# Patient Record
Sex: MALE | Race: WHITE
[De-identification: names, ages, dates, MRNs, and addresses within clinical notes are randomized per-mention and may not be internally consistent; named-entity substitution may affect disease eponyms.]

---

## 2018-01-03 ENCOUNTER — HOSPITAL ENCOUNTER (OUTPATIENT)
Dept: HOSPITAL 17 - PHSDC | Age: 53
Discharge: HOME | End: 2018-01-03
Attending: SURGERY
Payer: MEDICARE

## 2018-01-03 VITALS
RESPIRATION RATE: 16 BRPM | DIASTOLIC BLOOD PRESSURE: 76 MMHG | SYSTOLIC BLOOD PRESSURE: 142 MMHG | TEMPERATURE: 98.1 F | OXYGEN SATURATION: 95 % | HEART RATE: 84 BPM

## 2018-01-03 VITALS — HEART RATE: 91 BPM

## 2018-01-03 VITALS — HEIGHT: 66 IN | WEIGHT: 242.51 LBS | BODY MASS INDEX: 38.97 KG/M2

## 2018-01-03 DIAGNOSIS — M72.0: Primary | ICD-10-CM

## 2018-01-03 LAB
ERYTHROCYTE [DISTWIDTH] IN BLOOD BY AUTOMATED COUNT: 13.5 % (ref 11.6–17.2)
HCT VFR BLD CALC: 36.5 % (ref 39–51)
HGB BLD-MCNC: 11.9 GM/DL (ref 13–17)
MCH RBC QN AUTO: 28.5 PG (ref 27–34)
MCHC RBC AUTO-ENTMCNC: 32.5 % (ref 32–36)
MCV RBC AUTO: 87.6 FL (ref 80–100)
PLATELET # BLD: 217 TH/MM3 (ref 150–450)
PMV BLD AUTO: 7.6 FL (ref 7–11)
RBC # BLD AUTO: 4.17 MIL/MM3 (ref 4.5–5.9)
WBC # BLD AUTO: 7.2 TH/MM3 (ref 4–11)

## 2018-01-03 PROCEDURE — 36415 COLL VENOUS BLD VENIPUNCTURE: CPT

## 2018-01-03 PROCEDURE — 01810 ANES PX NRV MUSC F/ARM WRST: CPT

## 2018-01-03 PROCEDURE — 88304 TISSUE EXAM BY PATHOLOGIST: CPT

## 2018-01-03 PROCEDURE — 26123 RELEASE PALM CONTRACTURE: CPT

## 2018-01-03 PROCEDURE — 85027 COMPLETE CBC AUTOMATED: CPT

## 2018-01-03 PROCEDURE — 26125 RELEASE PALM CONTRACTURE: CPT

## 2018-01-03 NOTE — MP
cc:

MING HARDEN III, M.D.

****

 

 

DATE OF SURGERY

01/03/2018

 

PREOPERATIVE DIAGNOSIS

Left fourth and fifth Dupuytren's contractures.

 

 

PROCEDURE PERFORMED

1. Left fifth palmar fasciectomy to the PIP joint.

2. Left fourth finger palmar fasciectomy.

3. Full-thickness skin graft left small finger 15 cm2 total.

4. Periarterial sympathectomy superficial palmar arch left hand.

 

SURGEON

Ming Santiago III, MD

 

 

PROCEDURE

The patient was brought to the operative, placed supine on the operating table

after the correct site and side of surgery were verified by members of each

team in the room multiple times including the patient and myself and, after

adequate preoperative markings and preoperative written consent were verified

by everyone and, after adequate preop time-out was performed to everyone's

satisfaction, and after adequate general anesthesia had been achieved, the left

upper extremity was prepped and draped in traditional sterile surgical fashion.

A 50/50 mixture of 2% plain lidocaine and 0.5% plain Marcaine was infiltrated

in the skin and the subcutaneous tissue in the area of the planned incision.

The limb was exsanguinated with an Ace wrap.  A highly placed well-padded

axillary tourniquet was inflated to 200 mmHg for a total 35 minutes.

 

A longitudinally oriented incision over the palmar cords were made in the fifth

ray, carried down through skin and subcutaneous tissue.  Blunt dissection was

then used and the cord was found to be very extensive and was dissected free

all the way out to the PIP joint.  The checkrein ligaments were then incised

and the volar plate had to be incised as well to allow for extension of the PIP

joint.  There was still contracture but when the wrist was flexed the finger

was easily extended to neutral.  Relaxing incisions had to be made in the PIP

and the MP flexion creases as well as the distal palmar flexion crease.

 

The fascial cord to the fourth finger was also dissected free using the same

incision and this went just distal to the MP flexion crease.  The ring finger

was easily fully extended.  At this point the axillary tourniquet was released

and the hand and all fingers became immediately soft, pink and warm except for

the distal two-thirds of the small finger.  Neurovascular bundles were

identified early in the operation and protected the entire time and their

condition was then verified and they were not injured.

 

Periarticular sympathectomy in the palm was then performed in the common

digital arteries.  Sympathectomy along the area of the digital arteries that

was in spasm was performed.  There was no papaverine available.  2% plain

lidocaine was then used topically on the digital arteries and this had a

dramatic effect.  The finger slowly became soft, pink, full and warm and had

brisk capillary refill of less than 2 seconds.  There was no active bleeding

anywhere.  The A1 pulley had been incised during the dissection.  Neurovascular

bundles were then examined again and were found be in continuity and

uncompromised.

 

Skin edges were then reapproximated using interrupted 4-0 nylon sutures.

Z-plasty was used at the distal palmar flexion crease.  The flaps on the small

finger were loosely closed and the full-thickness skin graft was harvested from

the upper inner left arm in the usual fashion, this donor area closed with deep

4-0 Vicryls and then a running 4-0 Monocryl suture.  The skin graft was secured

in place using 4-0 chromic suture after it was tailored to size and all the

subcutaneous fatty tissue removed.  There were no open wounds except for a

small area on the palm where a 1/4-inch Penrose drain that was split in half

longitudinally was left coming from.  Tenodesis was performed and was normal.

The hand and arm were thoroughly cleansed and dried.  Mastisol and Steri-Strips

were applied on top the donor site in the upper arm.  Adaptic and a bulky soft

dressing was applied in the palm and the hand was splinted in ice cream scoop

fashion.

 

Capillary refill was less than 2 seconds in all fingertips.  The patient was

awakened from anesthesia and transported to the Post-Anesthesia Care Unit awake

and in stable condition at the end of the case.

 

 

 

 

 

The sponge, needle and instrument counts were correct at the end of the case as

reported by the nurses in the room.

 

 

 

                              _________________________________

                              MD BRANDON Oglesby III/SAVANNAH

D:  1/3/2018/11:29 AM

T:  1/3/2018/11:47 AM

Visit #:  [id number]

Job #:  [id number]